# Patient Record
Sex: FEMALE | Race: WHITE
[De-identification: names, ages, dates, MRNs, and addresses within clinical notes are randomized per-mention and may not be internally consistent; named-entity substitution may affect disease eponyms.]

---

## 2017-04-28 ENCOUNTER — HOSPITAL ENCOUNTER (EMERGENCY)
Dept: HOSPITAL 54 - ER | Age: 47
Discharge: HOME | End: 2017-04-28
Payer: COMMERCIAL

## 2017-04-28 VITALS — BODY MASS INDEX: 26.58 KG/M2 | WEIGHT: 150 LBS | HEIGHT: 63 IN

## 2017-04-28 VITALS — DIASTOLIC BLOOD PRESSURE: 80 MMHG | SYSTOLIC BLOOD PRESSURE: 132 MMHG

## 2017-04-28 DIAGNOSIS — D50.9: Primary | ICD-10-CM

## 2017-04-28 DIAGNOSIS — N92.0: ICD-10-CM

## 2017-04-28 DIAGNOSIS — K21.9: ICD-10-CM

## 2017-04-28 LAB
ALBUMIN SERPL BCP-MCNC: 3.5 G/DL (ref 3.4–5)
ALP SERPL-CCNC: 50 U/L (ref 46–116)
ALT SERPL W P-5'-P-CCNC: 22 U/L (ref 12–78)
ANISOCYTOSIS BLD QL: (no result)
AST SERPL W P-5'-P-CCNC: 20 U/L (ref 15–37)
BACTERIA UR CULT: NO
BASOPHILS # BLD AUTO: 0 /CMM (ref 0–0.2)
BASOPHILS NFR BLD AUTO: 0.5 % (ref 0–2)
BILIRUB DIRECT SERPL-MCNC: 0.1 MG/DL (ref 0–0.2)
BILIRUB SERPL-MCNC: 0.2 MG/DL (ref 0.2–1)
BILIRUB UR QL STRIP: (no result)
BUN SERPL-MCNC: 10 MG/DL (ref 7–18)
CALCIUM SERPL-MCNC: 8.6 MG/DL (ref 8.5–10.1)
CHLORIDE SERPL-SCNC: 104 MMOL/L (ref 98–107)
CO2 SERPL-SCNC: 29 MMOL/L (ref 21–32)
CREAT SERPL-MCNC: 0.7 MG/DL (ref 0.6–1.3)
EOSINOPHIL # BLD AUTO: 0.1 /CMM (ref 0–0.7)
EOSINOPHIL NFR BLD AUTO: 3.5 % (ref 0–6)
EOSINOPHIL NFR BLD MANUAL: 3 % (ref 0–4)
GFR SERPLBLD BASED ON 1.73 SQ M-ARVRAT: 89 ML/MIN (ref 60–?)
GLUCOSE SERPL-MCNC: 90 MG/DL (ref 74–106)
HCT VFR BLD AUTO: 29 % (ref 33–45)
HGB BLD-MCNC: 9 G/DL (ref 11.5–14.8)
HYPOCHROMIA BLD QL: (no result)
LIPASE SERPL-CCNC: 66 U/L (ref 73–393)
LYMPHOCYTES NFR BLD AUTO: 1.1 /CMM (ref 0.8–4.8)
LYMPHOCYTES NFR BLD AUTO: 30.3 % (ref 20–44)
LYMPHOCYTES NFR BLD MANUAL: 19 % (ref 16–48)
MCH RBC QN AUTO: 22 PG (ref 26–33)
MCHC RBC AUTO-ENTMCNC: 32 G/DL (ref 31–36)
MCV RBC AUTO: 68 FL (ref 82–100)
MONOCYTES NFR BLD AUTO: 0.3 /CMM (ref 0.1–1.3)
MONOCYTES NFR BLD AUTO: 8.2 % (ref 2–12)
MONOCYTES NFR BLD MANUAL: 1 % (ref 0–11)
NEUTROPHILS # BLD AUTO: 2.1 /CMM (ref 1.8–8.9)
NEUTROPHILS NFR BLD AUTO: 57.5 % (ref 43–81)
NEUTS SEG NFR BLD MANUAL: 77 % (ref 42–76)
PH UR STRIP: 8.5 [PH] (ref 5–8)
PLATELET # BLD AUTO: 294 /CMM (ref 150–450)
PLATELET BLD QL SMEAR: ADEQUATE
POTASSIUM SERPL-SCNC: 3.8 MMOL/L (ref 3.5–5.1)
PROT SERPL-MCNC: 7.5 G/DL (ref 6.4–8.2)
PROT UR QL STRIP: 100 MG/DL
RBC # BLD AUTO: 4.18 MIL/UL (ref 4–5.2)
RBC #/AREA URNS HPF: (no result) /HPF (ref 0–2)
RDW COEFFICIENT OF VARIATION: 17.5 (ref 11.5–15)
SODIUM SERPL-SCNC: 138 MMOL/L (ref 136–145)
SP GR UR STRIP: 1.02 (ref 1–1.03)
TROPONIN I SERPL-MCNC: < 0.017 NG/ML (ref 0–0.06)
UROBILINOGEN UR STRIP-MCNC: 2 EU/DL
WBC #/AREA URNS HPF: (no result) /HPF (ref 0–3)
WBC NRBC COR # BLD AUTO: 3.6 K/UL (ref 4.3–11)

## 2017-04-28 PROCEDURE — Z7610: HCPCS

## 2017-04-28 PROCEDURE — A4606 OXYGEN PROBE USED W OXIMETER: HCPCS

## 2017-04-28 NOTE — NUR
Presents self to ed due to multiple complaints-- cough and congestio, 
generalized weakness and fever every nightx 2 weeks. Patient is aao3, appears 
in no apparent distress. No sob noted, no chest pain. Pt also reported 
vomitting yesterday. Afebrile at this time.  Gowned pt on placed on tele 
monitor.

## 2017-11-21 ENCOUNTER — HOSPITAL ENCOUNTER (INPATIENT)
Dept: HOSPITAL 54 - ER | Age: 47
LOS: 2 days | Discharge: HOME | DRG: 263 | End: 2017-11-23
Attending: NURSE PRACTITIONER | Admitting: NURSE PRACTITIONER
Payer: COMMERCIAL

## 2017-11-21 VITALS — DIASTOLIC BLOOD PRESSURE: 72 MMHG | SYSTOLIC BLOOD PRESSURE: 126 MMHG

## 2017-11-21 VITALS — SYSTOLIC BLOOD PRESSURE: 125 MMHG | DIASTOLIC BLOOD PRESSURE: 68 MMHG

## 2017-11-21 VITALS — BODY MASS INDEX: 37.04 KG/M2 | WEIGHT: 209.06 LBS | HEIGHT: 63 IN

## 2017-11-21 DIAGNOSIS — D50.9: ICD-10-CM

## 2017-11-21 DIAGNOSIS — E66.9: ICD-10-CM

## 2017-11-21 DIAGNOSIS — C50.911: ICD-10-CM

## 2017-11-21 DIAGNOSIS — K80.10: Primary | ICD-10-CM

## 2017-11-21 DIAGNOSIS — K92.2: ICD-10-CM

## 2017-11-21 DIAGNOSIS — Z98.84: ICD-10-CM

## 2017-11-21 DIAGNOSIS — K22.6: ICD-10-CM

## 2017-11-21 DIAGNOSIS — K21.0: ICD-10-CM

## 2017-11-21 LAB
ALBUMIN SERPL BCP-MCNC: 3.6 G/DL (ref 3.4–5)
ALP SERPL-CCNC: 45 U/L (ref 46–116)
ALT SERPL W P-5'-P-CCNC: 24 U/L (ref 12–78)
APPEARANCE UR: CLEAR
APTT PPP: 26 SEC (ref 23–34)
AST SERPL W P-5'-P-CCNC: 16 U/L (ref 15–37)
BASOPHILS # BLD AUTO: 0 /CMM (ref 0–0.2)
BASOPHILS NFR BLD AUTO: 0.1 % (ref 0–2)
BILIRUB DIRECT SERPL-MCNC: 0 MG/DL (ref 0–0.2)
BILIRUB SERPL-MCNC: 0.3 MG/DL (ref 0.2–1)
BILIRUB UR QL STRIP: NEGATIVE
BUN SERPL-MCNC: 13 MG/DL (ref 7–18)
CALCIUM SERPL-MCNC: 8.6 MG/DL (ref 8.5–10.1)
CHLORIDE SERPL-SCNC: 104 MMOL/L (ref 98–107)
CO2 SERPL-SCNC: 26 MMOL/L (ref 21–32)
COLOR UR: YELLOW
CREAT SERPL-MCNC: 0.6 MG/DL (ref 0.6–1.3)
EOSINOPHIL # BLD AUTO: 0.1 /CMM (ref 0–0.7)
EOSINOPHIL NFR BLD AUTO: 1.7 % (ref 0–6)
EOSINOPHIL NFR BLD MANUAL: 4 % (ref 0–4)
FERRITIN SERPL-MCNC: 7 NG/ML (ref 8–388)
GLUCOSE SERPL-MCNC: 99 MG/DL (ref 74–106)
GLUCOSE UR STRIP-MCNC: NEGATIVE MG/DL
HCT VFR BLD AUTO: 29 % (ref 33–45)
HGB BLD-MCNC: 8.7 G/DL (ref 11.5–14.8)
HGB UR QL STRIP: (no result) ERY/UL
INR PPP: 1.03 (ref 0.87–1.13)
IRON SERPL-MCNC: 20 UG/DL (ref 50–175)
KETONES UR STRIP-MCNC: NEGATIVE MG/DL
LEUKOCYTE ESTERASE UR QL STRIP: NEGATIVE
LIPASE SERPL-CCNC: 105 U/L (ref 73–393)
LYMPHOCYTES NFR BLD AUTO: 1.8 /CMM (ref 0.8–4.8)
LYMPHOCYTES NFR BLD AUTO: 34.9 % (ref 20–44)
LYMPHOCYTES NFR BLD MANUAL: 39 % (ref 16–48)
MCH RBC QN AUTO: 21 PG (ref 26–33)
MCHC RBC AUTO-ENTMCNC: 30 G/DL (ref 31–36)
MCV RBC AUTO: 70 FL (ref 82–100)
MONOCYTES NFR BLD AUTO: 0.4 /CMM (ref 0.1–1.3)
MONOCYTES NFR BLD AUTO: 7.5 % (ref 2–12)
MONOCYTES NFR BLD MANUAL: 6 % (ref 0–11)
NEUTROPHILS # BLD AUTO: 2.8 /CMM (ref 1.8–8.9)
NEUTROPHILS NFR BLD AUTO: 55.8 % (ref 43–81)
NEUTS BAND NFR BLD MANUAL: 1 % (ref 0–5)
NEUTS SEG NFR BLD MANUAL: 50 % (ref 42–76)
NITRITE UR QL STRIP: NEGATIVE
PH UR STRIP: 5.5 [PH] (ref 5–8)
PLATELET # BLD AUTO: 375 /CMM (ref 150–450)
POTASSIUM SERPL-SCNC: 4.3 MMOL/L (ref 3.5–5.1)
PROT SERPL-MCNC: 7.4 G/DL (ref 6.4–8.2)
PROT UR QL STRIP: NEGATIVE MG/DL
PROTHROMBIN TIME: 10.7 SECS (ref 9.5–12.7)
RBC # BLD AUTO: 4.11 MIL/UL (ref 4–5.2)
RBC #/AREA URNS HPF: (no result) /HPF (ref 0–2)
RDW COEFFICIENT OF VARIATION: 18 (ref 11.5–15)
SODIUM SERPL-SCNC: 137 MMOL/L (ref 136–145)
TIBC SERPL-MCNC: 389 UG/DL (ref 250–450)
UROBILINOGEN UR STRIP-MCNC: 0.2 EU/DL
WBC #/AREA URNS HPF: (no result) /HPF (ref 0–3)
WBC NRBC COR # BLD AUTO: 5.1 K/UL (ref 4.3–11)

## 2017-11-21 PROCEDURE — A4606 OXYGEN PROBE USED W OXIMETER: HCPCS

## 2017-11-21 PROCEDURE — Z7610: HCPCS

## 2017-11-21 RX ADMIN — FAMOTIDINE SCH MG: 20 TABLET, FILM COATED ORAL at 20:05

## 2017-11-21 RX ADMIN — ACETAMINOPHEN PRN MG: 325 TABLET ORAL at 21:47

## 2017-11-21 NOTE — NUR
MS RN CLOSING NOTES



PT RESTING IN BED AT MODERATE HIGH BACKREST. A/O X 3-4, SAME ABLE TO MAKE NEEDS KNOWN 
VERBALLY. ON ROOM AIR, BREATHING EVEN AND UNLABORED. IV ACCESS ON LEFT AC G#20 INTACT AND 
PATENT, HL ONLY. KEPT BED IN LOW AND LOCKED POSITION WITH UPPER SIDE-RAILS UP X 2. CALL 
LIGHT AND BEDSIDE TABLE WITHIN REACH OF PT. ALL SAFETY MEASURES MAINTAINED. PATIENT ON  NPO 
PENDING G.I. CONSULT. STOOL FOR OCCULT TO BE COLLECTED. ALL NEEDS AND CARE ATTENDED WELL. 
WILL ENDORSED TO NIGHT SHIFT NURSE FOR ELIZABETH.

## 2017-11-21 NOTE — NUR
PATIENT TRANSPORTED TO [facility] VIA WHEELCHAIR FOR ADMISSION.  RN, EMMAUNEL TO PROVIDE 
ELIZABETH.

## 2017-11-21 NOTE — NUR
PRESENTS TO ER C/O WORSENING ABDOMINAL PAIN X 1 MONTH, N/V.  A/OX 4. BREATHING 
EVEN AND UNLABORED. NO SOB. VITALS STABLE.  SAFETY AND COMFORT MEASURES IN 
PLACE. AWAITING MD ORDERS.

## 2017-11-21 NOTE — NUR
MS/RN OPENING NOTES

PATIENT IN BED, AWAKE, ALERT X4. ABLE TO VERBALIZE NEEDS, CAN AMBULATE W/ ASSISTANCE, FAMILY 
WAS AT BED SIDE AND INVOLVE WITH CARE. VERBALIZED DOCTORS TO EVALUATE HER AND INFORM THAT MD 
WILL BE MAKING ROUNDS AND HAVE ORDERED FOR NPO AT THIS TIME FOR GI CONSULT, ABLE TO TAKE 
MEDS PER MD ORDER., VERBALIZED SOME HEADACHE AND MD CONTACTED WITH ORDER RECEIVED FOR 
TYLENOL 650NG PO AS NEEDED FOR PAIN 1-4/10 LEVEL. WILL CONTINUE TO MONITOR AND PROVIDE CARE. 
RECEIVED ELIZABETH FROM AM RN. BED IN LOCK POSITION, CALL LIGHTS WITHIN REACH.

## 2017-11-21 NOTE — NUR
RN NOTES



PT ADMITTED TO UNIT AT 1630H VIA WHEELCHAIR ACCOMPANIED BY LYUDMILA.CHARISSE. TRANSPORTER AND DAUGHTER. 
A/O X 3-4, VERBALLY RESPONSIVE, NO C/O ABDOMINAL PAIN , N & V AT THIS TIME. PT WITH 
ADMITTING DIAGNOSIS OF ANEMIA  AND C/C OF ABDOMINAL PAIN, N&V AND WEAKNESS. PT HAS 
SIGNIFICANT DX OF BREAST CANCER AND GASTRIC LAP BAND 10 YRS AGO AND BONE OVERGROWTH REMOVAL 
FR LEFT LEG IN 1980. PT WITH IV ACCESS ON LEFT AC G#20 INTACT AND PATENT. PT HAS INTACT SKIN 
AND ABLE TO AMBULATE. PLACED BED IN LOW AND LOCKED POSITION WITH UPPER SIDE-RAILS UP X 2. 
CALL LIGHT AND BEDSIDE TABLE PLACED WITHIN REACH OF PT. ALL SAFETY MEASURES INITIATED. MD 
ORDER PATIENT ON  NPO AT THIS TIME PENDING G.I. CONSULT.  WILL CONTINUE TO MONITOR PT .

## 2017-11-22 VITALS — DIASTOLIC BLOOD PRESSURE: 75 MMHG | SYSTOLIC BLOOD PRESSURE: 128 MMHG

## 2017-11-22 VITALS — SYSTOLIC BLOOD PRESSURE: 131 MMHG | DIASTOLIC BLOOD PRESSURE: 69 MMHG

## 2017-11-22 VITALS — DIASTOLIC BLOOD PRESSURE: 69 MMHG | SYSTOLIC BLOOD PRESSURE: 112 MMHG

## 2017-11-22 PROCEDURE — 0DB78ZX EXCISION OF STOMACH, PYLORUS, VIA NATURAL OR ARTIFICIAL OPENING ENDOSCOPIC, DIAGNOSTIC: ICD-10-PCS

## 2017-11-22 RX ADMIN — FAMOTIDINE SCH MG: 20 TABLET, FILM COATED ORAL at 08:39

## 2017-11-22 RX ADMIN — FAMOTIDINE SCH MG: 20 TABLET, FILM COATED ORAL at 20:22

## 2017-11-22 NOTE — NUR
MS/RN NOTES

PATIENT ABLE TO SLEEP DURING THE NIGHT, PAIN CONTROLLED W/ TYLENOL. CAN AMBULATE WITH 
ASSISTANCE, ATTEND TO NEEDS AND FAMILY INVOLVED, WILL DISCUSS W/ MD REGARDING PLAN OF CARE 
PER FAMILY , CALL LIGHTS WITHIN REACH, ON NPO, AWAITING FOR GI CONSULT. BED IN LOCK 
POSITION, WILL WNDORSE TO AM RN RE ELIZABETH.

## 2017-11-22 NOTE — NUR
MS RN CLOSING NOTES

PATIENT IS IN NO APPARENT DISTRESS. BED IS LOCKED AND LOWERED. BEDSIDE RAILS ARE UP X2. CALL 
LIGHT IS WITHIN REACH. GAVE REPORT TO JANET NIGHT SHIFT NURSE FOR ELIZABETH.

## 2017-11-22 NOTE — NUR
MS/RN OPENING NOTES



PT RECEIVED AWAKE, SITTING UP IN BED, FAMILY AT BEDSIDE. ON ROOM AIR, BREATHING EVEN AND 
UNLABORED. NO APPARENT DISTRESS NOTED. DENIES SOB, PAIN AND N/V AT THIS TIME. IV TO LAC 
PATENT AND INTACT. BED IN LOW/LOCKED, SIDE RAILS UPX2. CALL LIGHT IN REACH. WILL CONTINUE TO 
MONITOR

## 2017-11-22 NOTE — NUR
MS RN OPENING NOTES

PATIENT IS RESTING IN BED. IN NO APPARENT DISTRESS. BEDSIDE RAILS ARE UP X2. BED IS LOCKED 
AND LOWERED. CALL LIGHT IS WITHIN REACH. WILL CONTINUE TO MONITOR.

## 2017-11-22 NOTE — NUR
PATIENT RETURNED FROM EGD PROCEDURE. VITAL SIGNS ARE STABLE. PATIENT RETURNED ON REGULAR 
DIET AS TOLERATED.

## 2017-11-23 VITALS — DIASTOLIC BLOOD PRESSURE: 80 MMHG | SYSTOLIC BLOOD PRESSURE: 137 MMHG

## 2017-11-23 VITALS — DIASTOLIC BLOOD PRESSURE: 72 MMHG | SYSTOLIC BLOOD PRESSURE: 120 MMHG

## 2017-11-23 VITALS — DIASTOLIC BLOOD PRESSURE: 66 MMHG | SYSTOLIC BLOOD PRESSURE: 118 MMHG

## 2017-11-23 VITALS — DIASTOLIC BLOOD PRESSURE: 60 MMHG | SYSTOLIC BLOOD PRESSURE: 117 MMHG

## 2017-11-23 VITALS — DIASTOLIC BLOOD PRESSURE: 77 MMHG | SYSTOLIC BLOOD PRESSURE: 147 MMHG

## 2017-11-23 VITALS — DIASTOLIC BLOOD PRESSURE: 70 MMHG | SYSTOLIC BLOOD PRESSURE: 127 MMHG

## 2017-11-23 VITALS — DIASTOLIC BLOOD PRESSURE: 69 MMHG | SYSTOLIC BLOOD PRESSURE: 129 MMHG

## 2017-11-23 VITALS — DIASTOLIC BLOOD PRESSURE: 77 MMHG | SYSTOLIC BLOOD PRESSURE: 130 MMHG

## 2017-11-23 VITALS — DIASTOLIC BLOOD PRESSURE: 76 MMHG | SYSTOLIC BLOOD PRESSURE: 134 MMHG

## 2017-11-23 LAB
BUN SERPL-MCNC: 13 MG/DL (ref 7–18)
CALCIUM SERPL-MCNC: 8.2 MG/DL (ref 8.5–10.1)
CHLORIDE SERPL-SCNC: 104 MMOL/L (ref 98–107)
CO2 SERPL-SCNC: 25 MMOL/L (ref 21–32)
CREAT SERPL-MCNC: 0.5 MG/DL (ref 0.6–1.3)
GLUCOSE SERPL-MCNC: 93 MG/DL (ref 74–106)
HCT VFR BLD AUTO: 29 % (ref 33–45)
HGB BLD-MCNC: 9.1 G/DL (ref 11.5–14.8)
LYMPHOCYTES NFR BLD MANUAL: 42 % (ref 16–48)
MCH RBC QN AUTO: 22 PG (ref 26–33)
MCHC RBC AUTO-ENTMCNC: 31 G/DL (ref 31–36)
MCV RBC AUTO: 70 FL (ref 82–100)
MONOCYTES NFR BLD MANUAL: 8 % (ref 0–11)
NEUTS SEG NFR BLD MANUAL: 50 % (ref 42–76)
PLATELET # BLD AUTO: 319 /CMM (ref 150–450)
POTASSIUM SERPL-SCNC: 3.8 MMOL/L (ref 3.5–5.1)
RBC # BLD AUTO: 4.22 MIL/UL (ref 4–5.2)
RDW COEFFICIENT OF VARIATION: 17.5 (ref 11.5–15)
SODIUM SERPL-SCNC: 139 MMOL/L (ref 136–145)
WBC NRBC COR # BLD AUTO: 6.9 K/UL (ref 4.3–11)

## 2017-11-23 PROCEDURE — 0FT44ZZ RESECTION OF GALLBLADDER, PERCUTANEOUS ENDOSCOPIC APPROACH: ICD-10-PCS

## 2017-11-23 RX ADMIN — FAMOTIDINE SCH MG: 20 TABLET, FILM COATED ORAL at 08:05

## 2017-11-23 RX ADMIN — ACETAMINOPHEN PRN MG: 325 TABLET ORAL at 14:49

## 2017-11-23 NOTE — NUR
RN NOTES

PATIENT AWAKE ALERT AND VERBALLY RESPONSIVE, ABLE TO MAKE NEEDS KNOWN, RESPIRATIONS EVEN AND 
UNLABORED, CURRENTLY BACK FROM OR,TO BE ON PAIN MANAGEMENT AS ORDERED, VSS. LAC IV SITE 
PATENT AND INTACT, NO REDNESS OR INFILTRATION NOTED. SAFETY MEASURES IN PLACE, CLEAN, DRY 
AND COMFORTABLE, CALL LIGHT WITHIN EASY REACH WILL CONTINUE TO MONITOR

## 2017-11-23 NOTE — NUR
RN NOTES

PATIENT WITH DC ORDERS BY HOSPITALIST PT TOLERATING MEALS WELL, ABLE TO VOID FREELY, 
AMBULATING AT THIS TIME. NO SKIN ISSUES NOTED, WILL ASSIST IN DC PROCESS, IV REMOVED WITH NO 
ASE NOTED

## 2017-11-23 NOTE — NUR
RN NOTES

PATIENT AWAKE ALERT AND VERBALLY RESPONSIVE, ABLE TO MAKE NEEDS KNOWN, RESPIRATIONS EVEN AND 
UNLABORED, DENIES ANY PAIN OR DISCOMFORT AT THIS TIME. LAC IV SITE PATENT AND INTACT, NO 
REDNESS OR INFILTRATION NOTED. SAFETY MEASURES IN PLACE, CLEAN, DRY AND COMFORTABLE, CALL 
LIGHT WITHIN EASY REACH WILL CONTINUE TO MONITOR

## 2017-11-23 NOTE — NUR
MS/RN CLOSING NOTES



PT AWAKE, SITTING AT EDGE OF BED.  AT BEDSIDE. A/OX4. ON ROOM AIR, BREATHING EVEN AND 
UNLABORED. DENIES SOB OR PAIN. NO APPARENT DISTRESS NOTED. IV TO LAC PATENT AND INTACT. 
CONSENTS SIGNED AND CHECKLIST COMPLETED FOR SURGERY THIS AM. FLAGGED IN CHART. PT REMAINS 
NPO POST MIDNIGHT. MADE PT COMFORTABLE DURING SHIFT. ALL NEEDS MET. BED IN LOW/LOCKED 
POSITION WITH CALL LIGHT IN REACH. SIDE RAILS UPX2. WILL ENDORSE TO AM SHIFT ELIZABETH.

## 2017-11-23 NOTE — NUR
RN NOTES

PATIENT AWAKE ALERT AND VERBALLY RESPONSIVE, ABLE TO MAKE NEEDS KNOWN, RESPIRATIONS EVEN AND 
UNLABORED, DENIES ANY PAIN OR DISCOMFORT AT THIS TIME. LAC IV SITE AND ID BAND REMOVED WITH 
NO ASE NOTED.PATIENT WITH DISCHARGE ORDERS, ALL PRESCRIPTIONS PROVIDED TO PT AND REVIEWED 
WITH PT AND  WITH VERBAL UNDERSTANDING NOTED. ALL BELONGINGS ACCOUNTED FOR, ALL 
APPROPRIATE PAPER WORK SIGNED AND EXIT CARE REVIEWED WITH PT AND  WITH VERBAL 
UNDERSTANDING NOTED. ASSISTED TO LOBBY BY CNA, DISCHARGED IN STABLE CONDITION

## 2017-11-23 NOTE — NUR
RN NOTES

PATIENT AWAKE ALERT AND VERBALLY RESPONSIVE, ABLE TO MAKE NEEDS KNOWN, RESPIRATIONS EVEN AND 
UNLABORED, DENIES ANY PAIN OR DISCOMFORT AT THIS TIME. LAC IV SITE AND ID BAND REMOVED WITH 
NO ASE NOTED.PT WITH DISCHARGE ORDERS, ALL DISCHARGE INSTRUCTIONS REVIEWED WITH PT AND SON 
ANIL WITH NOTED VERBAL UNDERSTANDING SON WANTS TO MAKE APPT FOR PCP ON HIS OWN. ALL 
BELONGINGS ACCOUNTED FOR, ALL APPROPRIATE PAPERWORK REVIEWED AND SIGNED, NO SKIN ISSUES 
NOTED. ASSISTED TO LOBBY BY CNA DISCHARGED IN STABLE CONDITION

-------------------------------------------------------------------------------

Addendum: 11/23/17 at 1336 by TABATHA CROWE RN

-------------------------------------------------------------------------------

RN NOTES

INCORRECT ENTRY

## 2017-11-24 LAB
*HGBFRC HEMOGLOBIN C: 0 %
*HGBFRCHEMOGLOBIN VARIANT: (no result)
HGB A MFR BLD ELPH: 98.3 % (ref 94–98)
HGB A2 MFR BLD COLUMN CHROM: 1.7 % (ref 0.7–3.1)
HGB F MFR BLD: 0 % (ref 0–2)
HGB S MFR BLD: 0 %

## 2019-03-02 ENCOUNTER — HOSPITAL ENCOUNTER (EMERGENCY)
Dept: HOSPITAL 54 - ER | Age: 49
Discharge: HOME | End: 2019-03-02
Payer: COMMERCIAL

## 2019-03-02 VITALS — DIASTOLIC BLOOD PRESSURE: 68 MMHG | SYSTOLIC BLOOD PRESSURE: 122 MMHG

## 2019-03-02 VITALS — BODY MASS INDEX: 33.65 KG/M2 | HEIGHT: 68 IN | WEIGHT: 222 LBS

## 2019-03-02 DIAGNOSIS — Z98.890: ICD-10-CM

## 2019-03-02 DIAGNOSIS — D50.9: ICD-10-CM

## 2019-03-02 DIAGNOSIS — Z85.3: ICD-10-CM

## 2019-03-02 DIAGNOSIS — Z90.49: ICD-10-CM

## 2019-03-02 DIAGNOSIS — R10.84: Primary | ICD-10-CM

## 2019-03-02 LAB
ALBUMIN SERPL BCP-MCNC: 2.9 G/DL (ref 3.4–5)
ALP SERPL-CCNC: 54 U/L (ref 46–116)
ALT SERPL W P-5'-P-CCNC: 21 U/L (ref 12–78)
AST SERPL W P-5'-P-CCNC: 9 U/L (ref 15–37)
BASOPHILS # BLD AUTO: 0 /CMM (ref 0–0.2)
BASOPHILS NFR BLD AUTO: 0.5 % (ref 0–2)
BILIRUB DIRECT SERPL-MCNC: 0 MG/DL (ref 0–0.2)
BILIRUB SERPL-MCNC: 0 MG/DL (ref 0.2–1)
BUN SERPL-MCNC: 20 MG/DL (ref 7–18)
CALCIUM SERPL-MCNC: 7.8 MG/DL (ref 8.5–10.1)
CHLORIDE SERPL-SCNC: 110 MMOL/L (ref 98–107)
CO2 SERPL-SCNC: 23 MMOL/L (ref 21–32)
CREAT SERPL-MCNC: 0.7 MG/DL (ref 0.6–1.3)
EOSINOPHIL NFR BLD AUTO: 3.4 % (ref 0–6)
GLUCOSE SERPL-MCNC: 110 MG/DL (ref 74–106)
HCT VFR BLD AUTO: 30 % (ref 33–45)
HGB BLD-MCNC: 9.4 G/DL (ref 11.5–14.8)
LIPASE SERPL-CCNC: 193 U/L (ref 73–393)
LYMPHOCYTES NFR BLD AUTO: 2.2 /CMM (ref 0.8–4.8)
LYMPHOCYTES NFR BLD AUTO: 33 % (ref 20–44)
MCHC RBC AUTO-ENTMCNC: 31 G/DL (ref 31–36)
MCV RBC AUTO: 77 FL (ref 82–100)
MONOCYTES NFR BLD AUTO: 0.6 /CMM (ref 0.1–1.3)
MONOCYTES NFR BLD AUTO: 8.6 % (ref 2–12)
NEUTROPHILS # BLD AUTO: 3.7 /CMM (ref 1.8–8.9)
NEUTROPHILS NFR BLD AUTO: 54.5 % (ref 43–81)
PH UR STRIP: 5.5 [PH] (ref 5–8)
PLATELET # BLD AUTO: 365 /CMM (ref 150–450)
POTASSIUM SERPL-SCNC: 3.6 MMOL/L (ref 3.5–5.1)
PROT SERPL-MCNC: 6.6 G/DL (ref 6.4–8.2)
RBC # BLD AUTO: 3.89 MIL/UL (ref 4–5.2)
RBC #/AREA URNS HPF: (no result) /HPF (ref 0–2)
SODIUM SERPL-SCNC: 144 MMOL/L (ref 136–145)
UROBILINOGEN UR STRIP-MCNC: 0.2 EU/DL
WBC #/AREA URNS HPF: (no result) /HPF (ref 0–3)
WBC NRBC COR # BLD AUTO: 6.8 K/UL (ref 4.3–11)

## 2019-03-02 PROCEDURE — 85730 THROMBOPLASTIN TIME PARTIAL: CPT

## 2019-03-02 PROCEDURE — 99284 EMERGENCY DEPT VISIT MOD MDM: CPT

## 2019-03-02 PROCEDURE — 83690 ASSAY OF LIPASE: CPT

## 2019-03-02 PROCEDURE — 83605 ASSAY OF LACTIC ACID: CPT

## 2019-03-02 PROCEDURE — 87040 BLOOD CULTURE FOR BACTERIA: CPT

## 2019-03-02 PROCEDURE — 81001 URINALYSIS AUTO W/SCOPE: CPT

## 2019-03-02 PROCEDURE — 80076 HEPATIC FUNCTION PANEL: CPT

## 2019-03-02 PROCEDURE — 71045 X-RAY EXAM CHEST 1 VIEW: CPT

## 2019-03-02 PROCEDURE — 74177 CT ABD & PELVIS W/CONTRAST: CPT

## 2019-03-02 PROCEDURE — 96375 TX/PRO/DX INJ NEW DRUG ADDON: CPT

## 2019-03-02 PROCEDURE — 36415 COLL VENOUS BLD VENIPUNCTURE: CPT

## 2019-03-02 PROCEDURE — 85025 COMPLETE CBC W/AUTO DIFF WBC: CPT

## 2019-03-02 PROCEDURE — 80048 BASIC METABOLIC PNL TOTAL CA: CPT

## 2019-03-02 PROCEDURE — 96365 THER/PROPH/DIAG IV INF INIT: CPT

## 2019-03-02 NOTE — NUR
PT BIB SELF C/O Epigastric pain and fever since yesterday, PT IS AAOX4, NOT IN 
RESPIRATORY DISTRESS, V/S STABLE, KEPT RESTED AND COMFORTABLE. WILL CONTINUE TO 
MONITOR.

## 2020-03-06 ENCOUNTER — HOSPITAL ENCOUNTER (EMERGENCY)
Dept: HOSPITAL 54 - ER | Age: 50
Discharge: HOME | End: 2020-03-06
Payer: MEDICAID

## 2020-03-06 VITALS
WEIGHT: 230 LBS | SYSTOLIC BLOOD PRESSURE: 159 MMHG | DIASTOLIC BLOOD PRESSURE: 97 MMHG | BODY MASS INDEX: 38.32 KG/M2 | HEIGHT: 65 IN

## 2020-03-06 DIAGNOSIS — D64.9: ICD-10-CM

## 2020-03-06 DIAGNOSIS — Z85.3: ICD-10-CM

## 2020-03-06 DIAGNOSIS — J40: Primary | ICD-10-CM

## 2020-03-06 DIAGNOSIS — Z79.899: ICD-10-CM

## 2020-03-06 DIAGNOSIS — Z98.890: ICD-10-CM

## 2020-03-06 NOTE — NUR
PT CAME INTO THE ED C/O COUGH X10 DAYS, FEVER 3 DAYS, UNABLE TO SLEEP X3 DAYS. 
TOOK [facility] 2HRS PTA. PT ENDORSES PAIN ON THE CHEST AND L SIDED ABDOMEN WHEN 
COUGHING. PT AAOX4, RR EVEN AND UNLABORED ON RA W/ NAD NOTED. PT CONNECTED TO 
THE MONITOR AND POX

## 2020-10-26 ENCOUNTER — HOSPITAL ENCOUNTER (EMERGENCY)
Dept: HOSPITAL 54 - ER | Age: 50
Discharge: HOME | End: 2020-10-26
Payer: MEDICAID

## 2020-10-26 VITALS — SYSTOLIC BLOOD PRESSURE: 133 MMHG | DIASTOLIC BLOOD PRESSURE: 76 MMHG

## 2020-10-26 VITALS — BODY MASS INDEX: 36.96 KG/M2 | HEIGHT: 66 IN | WEIGHT: 230 LBS

## 2020-10-26 DIAGNOSIS — Z79.899: ICD-10-CM

## 2020-10-26 DIAGNOSIS — Z98.890: ICD-10-CM

## 2020-10-26 DIAGNOSIS — R31.9: ICD-10-CM

## 2020-10-26 DIAGNOSIS — D72.819: ICD-10-CM

## 2020-10-26 DIAGNOSIS — R50.9: Primary | ICD-10-CM

## 2020-10-26 DIAGNOSIS — Z90.49: ICD-10-CM

## 2020-10-26 DIAGNOSIS — K21.9: ICD-10-CM

## 2020-10-26 DIAGNOSIS — Z85.3: ICD-10-CM

## 2020-10-26 LAB
ALBUMIN SERPL BCP-MCNC: 3.2 G/DL (ref 3.4–5)
ALP SERPL-CCNC: 64 U/L (ref 46–116)
ALT SERPL W P-5'-P-CCNC: 46 U/L (ref 12–78)
APPEARANCE UR: CLEAR
AST SERPL W P-5'-P-CCNC: 28 U/L (ref 15–37)
BASOPHILS # BLD AUTO: 0 /CMM (ref 0–0.2)
BASOPHILS NFR BLD AUTO: 0.3 % (ref 0–2)
BILIRUB DIRECT SERPL-MCNC: 0 MG/DL (ref 0–0.2)
BILIRUB SERPL-MCNC: 0.2 MG/DL (ref 0.2–1)
BILIRUB UR QL STRIP: NEGATIVE
BUN SERPL-MCNC: 9 MG/DL (ref 7–18)
CALCIUM SERPL-MCNC: 8 MG/DL (ref 8.5–10.1)
CHLORIDE SERPL-SCNC: 102 MMOL/L (ref 98–107)
CO2 SERPL-SCNC: 24 MMOL/L (ref 21–32)
COLOR UR: YELLOW
CREAT SERPL-MCNC: 0.7 MG/DL (ref 0.6–1.3)
EOSINOPHIL NFR BLD AUTO: 0.3 % (ref 0–6)
GLUCOSE SERPL-MCNC: 110 MG/DL (ref 74–106)
GLUCOSE UR STRIP-MCNC: NEGATIVE MG/DL
HCT VFR BLD AUTO: 37 % (ref 33–45)
HGB BLD-MCNC: 12.1 G/DL (ref 11.5–14.8)
HGB UR QL STRIP: (no result) ERY/UL
KETONES UR STRIP-MCNC: NEGATIVE MG/DL
LEUKOCYTE ESTERASE UR QL STRIP: NEGATIVE
LIPASE SERPL-CCNC: 89 U/L (ref 73–393)
LYMPHOCYTES NFR BLD AUTO: 0.8 /CMM (ref 0.8–4.8)
LYMPHOCYTES NFR BLD AUTO: 32.8 % (ref 20–44)
LYMPHOCYTES NFR BLD MANUAL: 33 % (ref 16–48)
MCHC RBC AUTO-ENTMCNC: 32 G/DL (ref 31–36)
MCV RBC AUTO: 84 FL (ref 82–100)
MONOCYTES NFR BLD AUTO: 0.2 /CMM (ref 0.1–1.3)
MONOCYTES NFR BLD AUTO: 6.2 % (ref 2–12)
MONOCYTES NFR BLD MANUAL: 4 % (ref 0–11)
NEUTROPHILS # BLD AUTO: 1.5 /CMM (ref 1.8–8.9)
NEUTROPHILS NFR BLD AUTO: 60.4 % (ref 43–81)
NEUTS SEG NFR BLD MANUAL: 63 % (ref 42–76)
NITRITE UR QL STRIP: NEGATIVE
PH UR STRIP: 6 [PH] (ref 5–8)
PLATELET # BLD AUTO: 169 /CMM (ref 150–450)
POTASSIUM SERPL-SCNC: 4 MMOL/L (ref 3.5–5.1)
PROT SERPL-MCNC: 7.4 G/DL (ref 6.4–8.2)
PROT UR QL STRIP: NEGATIVE MG/DL
RBC # BLD AUTO: 4.42 MIL/UL (ref 4–5.2)
SODIUM SERPL-SCNC: 137 MMOL/L (ref 136–145)
UROBILINOGEN UR STRIP-MCNC: 0.2 EU/DL
WBC NRBC COR # BLD AUTO: 2.6 K/UL (ref 4.3–11)

## 2020-10-26 PROCEDURE — 99284 EMERGENCY DEPT VISIT MOD MDM: CPT

## 2020-10-26 PROCEDURE — 71045 X-RAY EXAM CHEST 1 VIEW: CPT

## 2020-10-26 PROCEDURE — 85025 COMPLETE CBC W/AUTO DIFF WBC: CPT

## 2020-10-26 PROCEDURE — 80076 HEPATIC FUNCTION PANEL: CPT

## 2020-10-26 PROCEDURE — 96374 THER/PROPH/DIAG INJ IV PUSH: CPT

## 2020-10-26 PROCEDURE — 85007 BL SMEAR W/DIFF WBC COUNT: CPT

## 2020-10-26 PROCEDURE — 81001 URINALYSIS AUTO W/SCOPE: CPT

## 2020-10-26 PROCEDURE — 36415 COLL VENOUS BLD VENIPUNCTURE: CPT

## 2020-10-26 PROCEDURE — 83690 ASSAY OF LIPASE: CPT

## 2020-10-26 PROCEDURE — 80048 BASIC METABOLIC PNL TOTAL CA: CPT

## 2020-10-26 NOTE — NUR
PATIENT CAME TO ER BED 6 C/O HEADACHE AND FEVER FOR THE PAST 5 DAYS. PATIENT 
STATES THAT SHE LAST TOOK TYLENOL IN THE MORNING @ AROUND 1000. PATIENT IS 
AAOX4. NO SOB .BREATHING EVENLY AND UNLABORED ON ROOM AIR. CONNECTED TO 
MONITOR.

## 2020-10-31 ENCOUNTER — HOSPITAL ENCOUNTER (EMERGENCY)
Dept: HOSPITAL 54 - ER | Age: 50
LOS: 1 days | Discharge: ANOTHER HOSPITAL | End: 2020-11-01
Payer: MEDICAID

## 2020-10-31 VITALS — HEIGHT: 63 IN | WEIGHT: 230 LBS | BODY MASS INDEX: 40.75 KG/M2

## 2020-10-31 VITALS — DIASTOLIC BLOOD PRESSURE: 85 MMHG | SYSTOLIC BLOOD PRESSURE: 162 MMHG

## 2020-10-31 DIAGNOSIS — Z90.49: ICD-10-CM

## 2020-10-31 DIAGNOSIS — R43.9: ICD-10-CM

## 2020-10-31 DIAGNOSIS — K44.9: ICD-10-CM

## 2020-10-31 DIAGNOSIS — K43.9: ICD-10-CM

## 2020-10-31 DIAGNOSIS — R10.10: ICD-10-CM

## 2020-10-31 DIAGNOSIS — M51.37: ICD-10-CM

## 2020-10-31 DIAGNOSIS — K76.0: ICD-10-CM

## 2020-10-31 DIAGNOSIS — U07.1: Primary | ICD-10-CM

## 2020-10-31 DIAGNOSIS — J91.8: ICD-10-CM

## 2020-10-31 DIAGNOSIS — N85.2: ICD-10-CM

## 2020-10-31 DIAGNOSIS — Z85.3: ICD-10-CM

## 2020-10-31 DIAGNOSIS — Z82.49: ICD-10-CM

## 2020-10-31 DIAGNOSIS — R53.1: ICD-10-CM

## 2020-10-31 DIAGNOSIS — J12.89: ICD-10-CM

## 2020-10-31 LAB
ALBUMIN SERPL BCP-MCNC: 2.8 G/DL (ref 3.4–5)
ALP SERPL-CCNC: 62 U/L (ref 46–116)
ALT SERPL W P-5'-P-CCNC: 50 U/L (ref 12–78)
AST SERPL W P-5'-P-CCNC: 48 U/L (ref 15–37)
BASOPHILS # BLD AUTO: 0 /CMM (ref 0–0.2)
BASOPHILS NFR BLD AUTO: 0.3 % (ref 0–2)
BILIRUB DIRECT SERPL-MCNC: 0 MG/DL (ref 0–0.2)
BILIRUB SERPL-MCNC: 0.3 MG/DL (ref 0.2–1)
BILIRUB UR QL STRIP: NEGATIVE
BUN SERPL-MCNC: 10 MG/DL (ref 7–18)
CALCIUM SERPL-MCNC: 8.1 MG/DL (ref 8.5–10.1)
CHLORIDE SERPL-SCNC: 103 MMOL/L (ref 98–107)
CO2 SERPL-SCNC: 23 MMOL/L (ref 21–32)
COLOR UR: YELLOW
CREAT SERPL-MCNC: 0.5 MG/DL (ref 0.6–1.3)
EOSINOPHIL NFR BLD AUTO: 0.2 % (ref 0–6)
GLUCOSE SERPL-MCNC: 101 MG/DL (ref 74–106)
GLUCOSE UR STRIP-MCNC: NEGATIVE MG/DL
HCT VFR BLD AUTO: 37 % (ref 33–45)
HGB BLD-MCNC: 11.9 G/DL (ref 11.5–14.8)
HGB UR QL STRIP: (no result) ERY/UL
LEUKOCYTE ESTERASE UR QL STRIP: NEGATIVE
LYMPHOCYTES NFR BLD AUTO: 1.1 /CMM (ref 0.8–4.8)
LYMPHOCYTES NFR BLD AUTO: 41 % (ref 20–44)
LYMPHOCYTES NFR BLD MANUAL: 50 % (ref 16–48)
MCHC RBC AUTO-ENTMCNC: 33 G/DL (ref 31–36)
MCV RBC AUTO: 84 FL (ref 82–100)
MONOCYTES NFR BLD AUTO: 0.2 /CMM (ref 0.1–1.3)
MONOCYTES NFR BLD AUTO: 8.2 % (ref 2–12)
MONOCYTES NFR BLD MANUAL: 4 % (ref 0–11)
NEUTROPHILS # BLD AUTO: 1.4 /CMM (ref 1.8–8.9)
NEUTROPHILS NFR BLD AUTO: 50.3 % (ref 43–81)
NEUTS SEG NFR BLD MANUAL: 46 % (ref 42–76)
NITRITE UR QL STRIP: NEGATIVE
PH UR STRIP: 6 [PH] (ref 5–8)
PLATELET # BLD AUTO: 172 /CMM (ref 150–450)
POTASSIUM SERPL-SCNC: 3.8 MMOL/L (ref 3.5–5.1)
PROT SERPL-MCNC: 7.1 G/DL (ref 6.4–8.2)
PROT UR QL STRIP: NEGATIVE MG/DL
RBC # BLD AUTO: 4.37 MIL/UL (ref 4–5.2)
RBC #/AREA URNS HPF: (no result) /HPF (ref 0–2)
SODIUM SERPL-SCNC: 138 MMOL/L (ref 136–145)
UROBILINOGEN UR STRIP-MCNC: 0.2 EU/DL
WBC #/AREA URNS HPF: (no result) /HPF
WBC #/AREA URNS HPF: (no result) /HPF (ref 0–3)
WBC NRBC COR # BLD AUTO: 2.8 K/UL (ref 4.3–11)

## 2020-10-31 PROCEDURE — 87086 URINE CULTURE/COLONY COUNT: CPT

## 2020-10-31 PROCEDURE — 81001 URINALYSIS AUTO W/SCOPE: CPT

## 2020-10-31 PROCEDURE — 36415 COLL VENOUS BLD VENIPUNCTURE: CPT

## 2020-10-31 PROCEDURE — 85730 THROMBOPLASTIN TIME PARTIAL: CPT

## 2020-10-31 PROCEDURE — 83690 ASSAY OF LIPASE: CPT

## 2020-10-31 PROCEDURE — 76705 ECHO EXAM OF ABDOMEN: CPT

## 2020-10-31 PROCEDURE — 87040 BLOOD CULTURE FOR BACTERIA: CPT

## 2020-10-31 PROCEDURE — C9803 HOPD COVID-19 SPEC COLLECT: HCPCS

## 2020-10-31 PROCEDURE — 93005 ELECTROCARDIOGRAM TRACING: CPT

## 2020-10-31 PROCEDURE — 85007 BL SMEAR W/DIFF WBC COUNT: CPT

## 2020-10-31 PROCEDURE — 99285 EMERGENCY DEPT VISIT HI MDM: CPT

## 2020-10-31 PROCEDURE — 85025 COMPLETE CBC W/AUTO DIFF WBC: CPT

## 2020-10-31 PROCEDURE — 96365 THER/PROPH/DIAG IV INF INIT: CPT

## 2020-10-31 PROCEDURE — 87426 SARSCOV CORONAVIRUS AG IA: CPT

## 2020-10-31 PROCEDURE — 71045 X-RAY EXAM CHEST 1 VIEW: CPT

## 2020-10-31 PROCEDURE — 83605 ASSAY OF LACTIC ACID: CPT

## 2020-10-31 PROCEDURE — 80076 HEPATIC FUNCTION PANEL: CPT

## 2020-10-31 PROCEDURE — 74176 CT ABD & PELVIS W/O CONTRAST: CPT

## 2020-10-31 PROCEDURE — 96375 TX/PRO/DX INJ NEW DRUG ADDON: CPT

## 2020-10-31 PROCEDURE — 80048 BASIC METABOLIC PNL TOTAL CA: CPT

## 2020-10-31 PROCEDURE — 84484 ASSAY OF TROPONIN QUANT: CPT

## 2020-10-31 PROCEDURE — 96361 HYDRATE IV INFUSION ADD-ON: CPT

## 2020-10-31 NOTE — NUR
TRANSFER INFO: PT WILL BE TRANSFERRED TO [facility] PER INSURANCE 
REQUEST

ACCEPTING MD: DR. TELLES

BED ASSIGNMENT 305B

NUMBER FOR REPORT: [phone]

NURSE FOR REPORT: JUSTO

[facility] ETA [phone]

## 2020-10-31 NOTE — NUR
BIB SON W C/O GENERALIZED WEAKNESS, LOSS OF TASTE, KAJAL ACHES AND APPETITE x 
1WEEK, ALSO C/O R SIDED ABDOMINAL PAIN AND NAUSEA. TO ER BED 7, HOOKED TO 
MONITOR, CHANGE DTO HOSP GOWN, WARM BLANKET PROVIDED, PATIENT AAO x 4, 
BREATHING EVEN AND UNLABORED. AWAITING MD LOPEZ.

## 2022-06-07 ENCOUNTER — HOSPITAL ENCOUNTER (EMERGENCY)
Dept: HOSPITAL 54 - ER | Age: 52
Discharge: HOME | End: 2022-06-07
Payer: MEDICAID

## 2022-06-07 VITALS — WEIGHT: 230 LBS | HEIGHT: 66 IN | BODY MASS INDEX: 36.96 KG/M2

## 2022-06-07 VITALS — DIASTOLIC BLOOD PRESSURE: 89 MMHG | SYSTOLIC BLOOD PRESSURE: 154 MMHG

## 2022-06-07 DIAGNOSIS — E11.9: ICD-10-CM

## 2022-06-07 DIAGNOSIS — Z79.899: ICD-10-CM

## 2022-06-07 DIAGNOSIS — E03.9: ICD-10-CM

## 2022-06-07 DIAGNOSIS — I10: ICD-10-CM

## 2022-06-07 DIAGNOSIS — Z85.3: ICD-10-CM

## 2022-06-07 DIAGNOSIS — Z87.19: ICD-10-CM

## 2022-06-07 DIAGNOSIS — R60.0: Primary | ICD-10-CM

## 2022-06-07 LAB
ALBUMIN SERPL BCP-MCNC: 3.7 G/DL (ref 3.4–5)
ALP SERPL-CCNC: 79 U/L (ref 46–116)
ALT SERPL W P-5'-P-CCNC: 58 U/L (ref 12–78)
AST SERPL W P-5'-P-CCNC: 35 U/L (ref 15–37)
BASOPHILS # BLD AUTO: 0 K/UL (ref 0–0.2)
BASOPHILS NFR BLD AUTO: 0.4 % (ref 0–2)
BILIRUB DIRECT SERPL-MCNC: 0 MG/DL (ref 0–0.2)
BILIRUB SERPL-MCNC: 0.2 MG/DL (ref 0.2–1)
BUN SERPL-MCNC: 19 MG/DL (ref 7–18)
CALCIUM SERPL-MCNC: 8.4 MG/DL (ref 8.5–10.1)
CHLORIDE SERPL-SCNC: 106 MMOL/L (ref 98–107)
CO2 SERPL-SCNC: 22 MMOL/L (ref 21–32)
CREAT SERPL-MCNC: 0.5 MG/DL (ref 0.6–1.3)
EOSINOPHIL NFR BLD AUTO: 2.4 % (ref 0–6)
GLUCOSE SERPL-MCNC: 120 MG/DL (ref 74–106)
HCT VFR BLD AUTO: 39 % (ref 33–45)
HGB BLD-MCNC: 12.1 G/DL (ref 11.5–14.8)
LYMPHOCYTES NFR BLD AUTO: 2.1 K/UL (ref 0.8–4.8)
LYMPHOCYTES NFR BLD AUTO: 31.4 % (ref 20–44)
MCHC RBC AUTO-ENTMCNC: 31 G/DL (ref 31–36)
MCV RBC AUTO: 80 FL (ref 82–100)
MONOCYTES NFR BLD AUTO: 0.5 K/UL (ref 0.1–1.3)
MONOCYTES NFR BLD AUTO: 7.8 % (ref 2–12)
NEUTROPHILS # BLD AUTO: 3.8 K/UL (ref 1.8–8.9)
NEUTROPHILS NFR BLD AUTO: 58 % (ref 43–81)
PLATELET # BLD AUTO: 278 K/UL (ref 150–450)
POTASSIUM SERPL-SCNC: 3.9 MMOL/L (ref 3.5–5.1)
PROT SERPL-MCNC: 8.1 G/DL (ref 6.4–8.2)
RBC # BLD AUTO: 4.85 MIL/UL (ref 4–5.2)
SODIUM SERPL-SCNC: 141 MMOL/L (ref 136–145)
WBC NRBC COR # BLD AUTO: 6.6 K/UL (ref 4.3–11)

## 2022-06-07 NOTE — NUR
BIBS for c/o bilateral lower extremity pain and swelling. Rates pain 5/10. In 
room air and denies SOB. Respiration regular and unlabored. Will continue to 
monitor the patient.